# Patient Record
Sex: FEMALE | Race: WHITE | NOT HISPANIC OR LATINO
[De-identification: names, ages, dates, MRNs, and addresses within clinical notes are randomized per-mention and may not be internally consistent; named-entity substitution may affect disease eponyms.]

---

## 2017-08-17 ENCOUNTER — MEDICATION RENEWAL (OUTPATIENT)
Age: 52
End: 2017-08-17

## 2017-08-29 ENCOUNTER — APPOINTMENT (OUTPATIENT)
Dept: UROLOGY | Facility: CLINIC | Age: 52
End: 2017-08-29
Payer: COMMERCIAL

## 2017-08-29 VITALS
SYSTOLIC BLOOD PRESSURE: 114 MMHG | HEART RATE: 81 BPM | OXYGEN SATURATION: 98 % | TEMPERATURE: 98.1 F | WEIGHT: 114 LBS | BODY MASS INDEX: 22.98 KG/M2 | DIASTOLIC BLOOD PRESSURE: 75 MMHG | HEIGHT: 59 IN

## 2017-08-29 PROCEDURE — 99213 OFFICE O/P EST LOW 20 MIN: CPT

## 2017-09-01 ENCOUNTER — MEDICATION RENEWAL (OUTPATIENT)
Age: 52
End: 2017-09-01

## 2019-04-19 ENCOUNTER — TRANSCRIPTION ENCOUNTER (OUTPATIENT)
Age: 54
End: 2019-04-19

## 2021-07-21 ENCOUNTER — NON-APPOINTMENT (OUTPATIENT)
Age: 56
End: 2021-07-21

## 2021-07-30 ENCOUNTER — APPOINTMENT (OUTPATIENT)
Dept: GYNECOLOGIC ONCOLOGY | Facility: CLINIC | Age: 56
End: 2021-07-30
Payer: COMMERCIAL

## 2021-07-30 VITALS
BODY MASS INDEX: 25 KG/M2 | SYSTOLIC BLOOD PRESSURE: 140 MMHG | OXYGEN SATURATION: 99 % | WEIGHT: 124 LBS | HEIGHT: 59 IN | DIASTOLIC BLOOD PRESSURE: 87 MMHG | HEART RATE: 94 BPM

## 2021-07-30 DIAGNOSIS — Z78.9 OTHER SPECIFIED HEALTH STATUS: ICD-10-CM

## 2021-07-30 DIAGNOSIS — Z80.3 FAMILY HISTORY OF MALIGNANT NEOPLASM OF BREAST: ICD-10-CM

## 2021-07-30 DIAGNOSIS — Z86.39 PERSONAL HISTORY OF OTHER ENDOCRINE, NUTRITIONAL AND METABOLIC DISEASE: ICD-10-CM

## 2021-07-30 DIAGNOSIS — Z80.1 FAMILY HISTORY OF MALIGNANT NEOPLASM OF TRACHEA, BRONCHUS AND LUNG: ICD-10-CM

## 2021-07-30 DIAGNOSIS — Z83.3 FAMILY HISTORY OF DIABETES MELLITUS: ICD-10-CM

## 2021-07-30 PROCEDURE — 99204 OFFICE O/P NEW MOD 45 MIN: CPT | Mod: 25

## 2021-07-30 PROCEDURE — 76857 US EXAM PELVIC LIMITED: CPT | Mod: 59

## 2021-07-30 PROCEDURE — 76830 TRANSVAGINAL US NON-OB: CPT | Mod: 59

## 2021-07-30 PROCEDURE — 58100 BIOPSY OF UTERUS LINING: CPT | Mod: 52

## 2021-07-30 RX ORDER — DIAZEPAM 2 MG/1
2 TABLET ORAL 3 TIMES DAILY
Qty: 90 | Refills: 0 | Status: DISCONTINUED | COMMUNITY
Start: 2017-08-17 | End: 2021-07-30

## 2021-08-02 NOTE — END OF VISIT
[FreeTextEntry3] : Written by Petty CUETO, acting as a scribe for Dr. Nicko Lai.\par This note accurately reflects the work and decisions made by me.\par

## 2021-08-02 NOTE — ASSESSMENT
[FreeTextEntry1] : 56yo female with PMB, postcoital bleeding with insufficient endometrial biopsy. Attempted endometrial biopsy in the office but could not perform due to cervical stenosis. The procedure was aborted for fear of hurting the patient.  Pelvic sonogram in office today was reassuring with no signs of abnormalities. Recommendation is for MRI pelvis to further evaluate her pelvic structures and to determine if there is a need for D&C. \par \par ADDENDUM: \par After the patient was examined and I left the room, she spoke to my MA and PA (Petty Serrano, who was present throughout the entire exam and discussion) and complained the exam was too quick, that I was "rough" during her exam and she felt she did not comprehend everything I said. I sat down with the patient in the consult room along side my PA and listened to all her concerns. She was upset about the hygenical practice in my office and overall felt violated. I offered patient my apologies for her feeling this way and reassured her that I performed my usual pelvic exam and attempted to perform a biopsy but quickly aborted the procedure for fear of hurting her. I thoroughly explained my recommendations to her again. Pt understood and stated she would have the MRI but was not sure if she would return to see me. I also offered to give her contact information for other gyn oncologists in case she decided not to return here.      \par

## 2021-08-02 NOTE — PHYSICAL EXAM
[Normal] : Bimanual Exam: Normal [de-identified] : Patient was interviewed and examined with chaperone present. Name of chaperone: Petty Preston

## 2021-08-02 NOTE — CHIEF COMPLAINT
[FreeTextEntry1] : [facility]\par \par [facility] [phone] at [address] [place] [phone]\par

## 2021-08-19 ENCOUNTER — APPOINTMENT (OUTPATIENT)
Dept: GYNECOLOGIC ONCOLOGY | Facility: CLINIC | Age: 56
End: 2021-08-19
Payer: COMMERCIAL

## 2021-08-19 DIAGNOSIS — N95.0 POSTMENOPAUSAL BLEEDING: ICD-10-CM

## 2021-08-19 PROCEDURE — 99214 OFFICE O/P EST MOD 30 MIN: CPT

## 2021-08-30 PROBLEM — N95.0 PMB (POSTMENOPAUSAL BLEEDING): Status: ACTIVE | Noted: 2021-07-30

## 2021-08-30 NOTE — HISTORY OF PRESENT ILLNESS
[FreeTextEntry1] : Pt is a 54 yo with episode of PMB related to intercourse who was seen initially by Dr. Lai. She had MRI and presents for follow up. She has no new gynecologic concerns today.

## 2021-08-30 NOTE — ASSESSMENT
[FreeTextEntry1] : Pt is a 56 yo with PMB related to intercourse who had MRI. I reviewed the initial note by Dr. Lai and reviewed the MRI from 6/21/21. There are no concerning masses in the uterus, but given thickend endometrial lining and bleeding it is recommended to perform a D&C, hysteroscopy. The patient agrees with plan of care.\par \par I discussed at length with the patient the nature, purpose, risks, benefits, and alternatives to dilation with curettage and possible hysteroscopy.   She understands the risks to include (but not be limited to): uterine perforation with possible need for laparoscopy and/or laparotomy; infection with need for hospitalization; fluid overload with possible critical illness as a consequence; and bleeding with need for transfusion.  The patient agrees to proceed.\par \par

## 2021-08-30 NOTE — END OF VISIT
[FreeTextEntry3] : D&C, hysteroscopy \par Pre-surgical testing, CBC, BMP, PCP clearance [FreeTextEntry2] : Una De La Garza MA was present the entire duration of the patient interaction\par

## 2021-09-09 ENCOUNTER — RESULT REVIEW (OUTPATIENT)
Age: 56
End: 2021-09-09

## 2021-09-23 ENCOUNTER — APPOINTMENT (OUTPATIENT)
Dept: GYNECOLOGIC ONCOLOGY | Facility: CLINIC | Age: 56
End: 2021-09-23
Payer: COMMERCIAL

## 2021-09-23 DIAGNOSIS — N93.0 POSTCOITAL AND CONTACT BLEEDING: ICD-10-CM

## 2021-09-23 PROCEDURE — 99213 OFFICE O/P EST LOW 20 MIN: CPT

## 2021-09-23 NOTE — DISCUSSION/SUMMARY
[Erythema] : was erythematous [Doing Well] : is doing well [Excellent Pain Control] : has excellent pain control [No Sign of Infection] : is showing no signs of infection

## 2021-09-23 NOTE — REASON FOR VISIT
[Post Op] : post op visit [de-identified] : 9/9/21 [de-identified] : D&C, hysteroscopy  [de-identified] : Pt presents for post-op check. She reports some bleeding following the procedure. No nausea/vomiting, no fevers/chills.

## 2021-09-23 NOTE — ASSESSMENT
[FreeTextEntry1] : Pt is a 56 yo s/p D&C for post-coital bleeding. Pathology benign with no concern for malignancy or atypia. We discussed options for management of post-coital bleeding likely related to vaginal atrophy. Vaginal estrogen or lubrication could be considered. IF those do not resolve the bleeding evaluation of UA could be considered to rule out possibility of urinary tract bleeding.

## 2021-09-23 NOTE — END OF VISIT
[FreeTextEntry3] : Discharge to routine gynecologic care\par Patient encouraged to follow up if her bleeding persists despite use of lubrication/estrogen [FreeTextEntry2] : Joy Zheng MA was present the entire duration of the patient interaction and gynecological exam.\par

## 2023-10-19 NOTE — HISTORY OF PRESENT ILLNESS
Spoke with patient's son Carlos regarding pt's pain and immobility. Patient does not have [facility] home nursing, therefore labs cannot be drawn at home.  Discussed need for ambulance transfer for further evaluation. He states patient fears falling. Carlos will discuss with his mother and return call.     Carlos returns call and states that he and his brothe will get mom to lab appointment scheduled for tomorrow in [place] and follow-up with Dr. Oliveira, pt's pcp regarding hip pain.    [FreeTextEntry1] : This 56yo ,  x2, LMP 2019 referred for PMB. Patient reports intermittent bleeding with intercourse for the past few years but recently had a moderate amount of bright red blood which concerned her.  She has occasional dyspareunia with deep penetration mostly. 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 Pelvic US on 21 revealed RV uterus, 3mm lining, normal ovaries. Endometrial biopsy on 21-strips of benign endocervical epithelium with mucin and blood. SHG was attempted but limited by adhesions, unable to sample endometrium. Denies significant pelvic pains. \par \par Health maintenance:\par \par Pap smear-2020-reports wnl, h/o abn paps and HPV \par [id number] wnl \par Colonoscopy-approx 10 years ago-reports wnl \par DEXA- never

## 2024-04-11 ENCOUNTER — APPOINTMENT (OUTPATIENT)
Dept: UROLOGY | Facility: CLINIC | Age: 59
End: 2024-04-11

## 2024-04-11 ENCOUNTER — APPOINTMENT (OUTPATIENT)
Dept: UROLOGY | Facility: CLINIC | Age: 59
End: 2024-04-11
Payer: COMMERCIAL

## 2024-04-11 VITALS
HEART RATE: 100 BPM | HEIGHT: 59 IN | DIASTOLIC BLOOD PRESSURE: 82 MMHG | SYSTOLIC BLOOD PRESSURE: 131 MMHG | OXYGEN SATURATION: 97 %

## 2024-04-11 DIAGNOSIS — R10.2 PELVIC AND PERINEAL PAIN: ICD-10-CM

## 2024-04-11 DIAGNOSIS — M79.18 MYALGIA, OTHER SITE: ICD-10-CM

## 2024-04-11 DIAGNOSIS — N94.819 VULVODYNIA, UNSPECIFIED: ICD-10-CM

## 2024-04-11 DIAGNOSIS — N95.2 POSTMENOPAUSAL ATROPHIC VAGINITIS: ICD-10-CM

## 2024-04-11 DIAGNOSIS — R35.0 FREQUENCY OF MICTURITION: ICD-10-CM

## 2024-04-11 DIAGNOSIS — R32 UNSPECIFIED URINARY INCONTINENCE: ICD-10-CM

## 2024-04-11 DIAGNOSIS — E83.119 HEMOCHROMATOSIS, UNSPECIFIED: ICD-10-CM

## 2024-04-11 PROCEDURE — 51798 US URINE CAPACITY MEASURE: CPT

## 2024-04-11 PROCEDURE — 99204 OFFICE O/P NEW MOD 45 MIN: CPT

## 2024-04-11 RX ORDER — ESTRADIOL 0.1 MG/G
0.1 CREAM VAGINAL
Qty: 1 | Refills: 3 | Status: ACTIVE | COMMUNITY
Start: 2024-04-11 | End: 1900-01-01

## 2024-04-12 LAB
APPEARANCE: CLEAR
BACTERIA: NEGATIVE /HPF
BILIRUBIN URINE: NEGATIVE
BLOOD URINE: NEGATIVE
CAST: 0 /LPF
COLOR: YELLOW
EPITHELIAL CELLS: 0 /HPF
GLUCOSE QUALITATIVE U: NEGATIVE MG/DL
KETONES URINE: NEGATIVE MG/DL
LEUKOCYTE ESTERASE URINE: NEGATIVE
MICROSCOPIC-UA: NORMAL
NITRITE URINE: NEGATIVE
PH URINE: 6
PROTEIN URINE: NEGATIVE MG/DL
RED BLOOD CELLS URINE: 0 /HPF
SPECIFIC GRAVITY URINE: 1.01
UROBILINOGEN URINE: 0.2 MG/DL
WHITE BLOOD CELLS URINE: 1 /HPF

## 2024-04-15 LAB
BACTERIA UR CULT: NORMAL
URINE CYTOLOGY: NORMAL

## 2024-06-13 ENCOUNTER — NON-APPOINTMENT (OUTPATIENT)
Age: 59
End: 2024-06-13

## 2024-06-13 RX ORDER — DIAZEPAM 2 MG/1
2 TABLET ORAL
Qty: 90 | Refills: 0 | Status: ACTIVE | COMMUNITY
Start: 2024-04-11 | End: 1900-01-01

## 2024-07-12 ENCOUNTER — APPOINTMENT (OUTPATIENT)
Dept: UROLOGY | Facility: CLINIC | Age: 59
End: 2024-07-12

## 2024-07-25 ENCOUNTER — APPOINTMENT (OUTPATIENT)
Dept: NEUROLOGY | Facility: CLINIC | Age: 59
End: 2024-07-25

## 2024-07-25 VITALS
BODY MASS INDEX: 24.8 KG/M2 | HEART RATE: 97 BPM | OXYGEN SATURATION: 97 % | HEIGHT: 59 IN | DIASTOLIC BLOOD PRESSURE: 80 MMHG | WEIGHT: 123 LBS | SYSTOLIC BLOOD PRESSURE: 138 MMHG

## 2024-07-25 DIAGNOSIS — R53.81 OTHER MALAISE: ICD-10-CM

## 2024-07-25 DIAGNOSIS — R53.83 OTHER FATIGUE: ICD-10-CM

## 2024-07-25 DIAGNOSIS — R51.9 HEADACHE, UNSPECIFIED: ICD-10-CM

## 2024-07-25 DIAGNOSIS — B27.90 INFECTIOUS MONONUCLEOSIS, UNSPECIFIED W/OUT COMPLICATION: ICD-10-CM

## 2024-07-25 PROCEDURE — 99205 OFFICE O/P NEW HI 60 MIN: CPT

## 2024-07-26 NOTE — DATA REVIEWED
[de-identified] : CT Head 6/28/24: FINDINGS:  VENTRICLES AND SULCI: Normal in size and configuration. INTRA-AXIAL: No intraparenchymal mass, acute hemorrhage, or midline shift. The gray-white differentiation is maintained. EXTRA-AXIAL: No mass or fluid collection. Basal cisterns are normal in appearance.  VISUALIZED SINUSES:  Clear. TYMPANOMASTOID CAVITIES:  Clear. VISUALIZED ORBITS: Normal. CALVARIUM: Intact.   IMPRESSION: No acute intracranial hemorrhage, mass effect, or midline shift.

## 2024-07-26 NOTE — HISTORY OF PRESENT ILLNESS
[FreeTextEntry1] : precious Donato is a 57YO female, presenting with complaints of fatigue and dizziness with palpitations. She was diagnosed with hemochromatosis a year ago. After treatment for that, she started having these elevations in BP. She was on Crestor and felt excellent, then after a few months, had an IBS attack, that prompted her MDs to stop the Crestor. She repots having blurred vision due to dry eye temporarily a few months ago, but reports her vision has been less clear ever since.  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  On 6/28 she felt disoriented, confused and lethargic, but felt better when she went outside, so called the fire dept and had oven tested which was emitting dangerous levels of carbon monoxide. tested negative for CO poisoning in ED. Oven was since repaired.  Now reports waking up daily with a headache, and palpitations. Urinary incontinence at night, has follow-up with Urology next week. Has cardiac CT planned for a couple weeks.  Feels like she is in a brain fog for weeks now. Sometimes feels off balance, can feel dizzy like the room is spinning.  On nortriptyline for GI but has known issues with palpitations when taking it.  Concerned for sleep apnea, reports daily morning headaches, daytime fatigue, napping often, and possible apneic moments during the night.  Takes Motrin with a cup of coffee for headaches, tries to take only 1 pill if she can, but sometimes will take 2 if more severe.

## 2024-07-26 NOTE — DATA REVIEWED
[de-identified] : CT Head 6/28/24: FINDINGS:  VENTRICLES AND SULCI: Normal in size and configuration. INTRA-AXIAL: No intraparenchymal mass, acute hemorrhage, or midline shift. The gray-white differentiation is maintained. EXTRA-AXIAL: No mass or fluid collection. Basal cisterns are normal in appearance.  VISUALIZED SINUSES:  Clear. TYMPANOMASTOID CAVITIES:  Clear. VISUALIZED ORBITS: Normal. CALVARIUM: Intact.   IMPRESSION: No acute intracranial hemorrhage, mass effect, or midline shift.

## 2024-07-26 NOTE — HISTORY OF PRESENT ILLNESS
[FreeTextEntry1] : precious Donato is a 59YO female, presenting with complaints of fatigue and dizziness with palpitations. She was diagnosed with hemochromatosis a year ago. After treatment for that, she started having these elevations in BP. She was on Crestor and felt excellent, then after a few months, had an IBS attack, that prompted her MDs to stop the Crestor. She repots having blurred vision due to dry eye temporarily a few months ago, but reports her vision has been less clear ever since.  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  On 6/28 she felt disoriented, confused and lethargic, but felt better when she went outside, so called the fire dept and had oven tested which was emitting dangerous levels of carbon monoxide. tested negative for CO poisoning in ED. Oven was since repaired.  Now reports waking up daily with a headache, and palpitations. Urinary incontinence at night, has follow-up with Urology next week. Has cardiac CT planned for a couple weeks.  Feels like she is in a brain fog for weeks now. Sometimes feels off balance, can feel dizzy like the room is spinning.  On nortriptyline for GI but has known issues with palpitations when taking it.  Concerned for sleep apnea, reports daily morning headaches, daytime fatigue, napping often, and possible apneic moments during the night.  Takes Motrin with a cup of coffee for headaches, tries to take only 1 pill if she can, but sometimes will take 2 if more severe.

## 2024-07-26 NOTE — ASSESSMENT
[FreeTextEntry1] : Keeley Donato is a 59Yo female, presenting today for complaints of headaches and fatigue, with intermittent dizziness and palpitations. Patient has been on nortriptyline for IBS, with known palpitations, last dose was last night and patient reports palpitations with exertion today. We discussed that this medication is contraindicated in patients with known palpitations, as it can lead to prolonged intervals in the heart rhythm, therefore patient should discontinue use immediately.   We discussed that there are several factors that can be contributing to the patient's symptoms including, Amarilis Barr virus, History of hemochromatosis with recently elevated Ferritin, Possible underlying sleep apnea or other autoimmune diseases that have not yet been tested. Upon review of the patient's CAT scan it is unlikely that the patient is suffering from NPH as ventricles are within normal limits.  We discussed the option of doing an MRI of the brain however patient has a history of severe reaction to MRI contrast and would like to avoid that at this time if not necessary.  Discussed with the patient that we cannot rule out a diagnosis of MS without MRI of the brain or cervical spine.  She would like to do other testing first before considering this.  Plan: - Labs to assess for underlying causes for fatigue, headaches, and assess for autoimmune disorders.  - Sleep study referral for possible [facility]. - Will call patient with results and follow-up in 2 months.

## 2024-07-26 NOTE — PHYSICAL EXAM
[FreeTextEntry1] : GENERAL PHYSICAL EXAM: GEN: no distress, normal affect EYES: sclera white, conjunctiva clear, no nystagmus PULM: no respiratory distress EXT: no edema, no cyanosis MSK: muscle tone and strength normal SKIN: warm, dry, no rash or lesion on exposed skin   NEUROLOGICAL EXAM: Mental Status Orientation: alert and oriented to person, place, time, and situation Language: clear and fluent, intact comprehension and repetition   Cranial Nerves II: visual fields full to confrontation III, IV, VI: PERRL, EOMI V, VII: facial sensation and movement intact and symmetric VIII: hearing intact IX, X: uvula midline, soft palate elevates normally XI: BL shoulder shrug intact XII: tongue midline   Motor Bilateral muscle strength 5/5 in UE and LE, proximally and distally Tone and bulk are normal in upper and lower limbs   Sensation Intact to light touch in all 4 EXTs   Reflex 2+ in BL biceps, brachioradialis, patella   Coordination Normal FTN bilaterally   Gait Normal stance, stride, and pivot turn Tandem walk intact Negative Romberg.

## 2024-08-08 ENCOUNTER — APPOINTMENT (OUTPATIENT)
Dept: UROLOGY | Facility: CLINIC | Age: 59
End: 2024-08-08

## 2024-10-02 ENCOUNTER — APPOINTMENT (OUTPATIENT)
Dept: NEUROLOGY | Facility: CLINIC | Age: 59
End: 2024-10-02
Payer: COMMERCIAL

## 2024-10-02 VITALS
BODY MASS INDEX: 24.8 KG/M2 | SYSTOLIC BLOOD PRESSURE: 141 MMHG | HEIGHT: 59 IN | WEIGHT: 123 LBS | OXYGEN SATURATION: 98 % | DIASTOLIC BLOOD PRESSURE: 101 MMHG | HEART RATE: 93 BPM

## 2024-10-02 DIAGNOSIS — R51.9 HEADACHE, UNSPECIFIED: ICD-10-CM

## 2024-10-02 DIAGNOSIS — R53.83 OTHER FATIGUE: ICD-10-CM

## 2024-10-02 PROCEDURE — 99215 OFFICE O/P EST HI 40 MIN: CPT

## 2024-10-02 RX ORDER — PANTOPRAZOLE 40 MG/1
TABLET, DELAYED RELEASE ORAL
Refills: 0 | Status: ACTIVE | COMMUNITY

## 2024-10-02 RX ORDER — NORTRIPTYLINE HYDROCHLORIDE 10 MG/1
10 CAPSULE ORAL
Refills: 0 | Status: ACTIVE | COMMUNITY

## 2024-10-02 NOTE — PHYSICAL EXAM
[General Appearance - Alert] : alert [General Appearance - In No Acute Distress] : in no acute distress [Oriented To Time, Place, And Person] : oriented to person, place, and time [Impaired Insight] : insight and judgment were intact [Affect] : the affect was normal [Person] : oriented to person [Place] : oriented to place [Time] : oriented to time [Concentration Intact] : normal concentrating ability [Visual Intact] : visual attention was ~T not ~L decreased [Naming Objects] : no difficulty naming common objects [Repeating Phrases] : no difficulty repeating a phrase [Writing A Sentence] : no difficulty writing a sentence [Fluency] : fluency intact [Comprehension] : comprehension intact [Reading] : reading intact [Past History] : adequate knowledge of personal past history [Cranial Nerves Optic (II)] : visual acuity intact bilaterally,  visual fields full to confrontation, pupils equal round and reactive to light [Cranial Nerves Oculomotor (III)] : extraocular motion intact [Cranial Nerves Trigeminal (V)] : facial sensation intact symmetrically [Cranial Nerves Facial (VII)] : face symmetrical [Cranial Nerves Vestibulocochlear (VIII)] : hearing was intact bilaterally [Cranial Nerves Glossopharyngeal (IX)] : tongue and palate midline [Cranial Nerves Accessory (XI - Cranial And Spinal)] : head turning and shoulder shrug symmetric [Cranial Nerves Hypoglossal (XII)] : there was no tongue deviation with protrusion [Motor Tone] : muscle tone was normal in all four extremities [Motor Strength] : muscle strength was normal in all four extremities [No Muscle Atrophy] : normal bulk in all four extremities [Sensation Tactile Decrease] : light touch was intact [Balance] : balance was intact [2+] : Ankle jerk left 2+ [Sclera] : the sclera and conjunctiva were normal [PERRL With Normal Accommodation] : pupils were equal in size, round, reactive to light, with normal accommodation [Outer Ear] : the ears and nose were normal in appearance [Neck Appearance] : the appearance of the neck was normal [Abnormal Walk] : normal gait [Skin Color & Pigmentation] : normal skin color and pigmentation [] : no rash [Past-pointing] : there was no past-pointing [Tremor] : no tremor present [Plantar Reflex Right Only] : normal on the right [Plantar Reflex Left Only] : normal on the left

## 2024-10-02 NOTE — HISTORY OF PRESENT ILLNESS
[FreeTextEntry1] : 10/2/24: Labs were negative for underlying causes for fatigue and dizziness.  Sleep study ordered, had consultation with sleep specialist on 8/26/24. Sleep study not completed as of this time. She is having GI issues, lost 15 lbs in the last year. Having trouble eating and has hemochromatosis therefore has a lot of allergic reactions to medications and intolerances. Diet is poor due to stomach pain, working with GI to find the underlying cause.  She reports dizziness and disorientation has improved significantly.  Patient does report new onset of headaches.  She reports these headaches are typically tension headaches in the occipital region but do, symptoms such as photophobia and lightheadedness.  She does report occasional nausea however unsure if that is related to the headaches or GI issues.  Patient reports normally being able to relieve these headaches with caffeine and Tylenol if needed.  She reports today she is having a more severe headache, 8 out of 10 pain.  Has not trialed Ubrelvy or Nurtec samples that were given to her at the last appointment as of this time.   7/25/24: Keeley Donato is a 59YO female, presenting with complaints of fatigue and dizziness with palpitations. She was diagnosed with hemochromatosis a year ago. After treatment for that, she started having these elevations in BP. She was on Crestor and felt excellent, then after a few months, had an IBS attack, that prompted her MDs to stop the Crestor. She repots having blurred vision due to dry eye temporarily a few months ago, but reports her vision has been less clear ever since.  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  On 6/28 she felt disoriented, confused and lethargic, but felt better when she went outside, so called the fire dept and had oven tested which was emitting dangerous levels of carbon monoxide. tested negative for CO poisoning in ED. Oven was since repaired.  Now reports waking up daily with a headache, and palpitations. Urinary incontinence at night, has follow-up with Urology next week. Has cardiac CT planned for a couple weeks.  Feels like she is in a brain fog for weeks now. Sometimes feels off balance, can feel dizzy like the room is spinning.  On nortriptyline for GI but has known issues with palpitations when taking it.  Concerned for sleep apnea, reports daily morning headaches, daytime fatigue, napping often, and possible apneic moments during the night.  Takes Motrin with a cup of coffee for headaches, tries to take only 1 pill if she can, but sometimes will take 2 if more severe.

## 2024-10-02 NOTE — ASSESSMENT
[FreeTextEntry1] : 58-year-old female presenting to the office today for follow-up for fatigue and dizziness.  Patient reports dizziness and disorientation has improved significantly.  She reports she is now having occasional occipital headaches, bilateral, 8 out of 10 pain at their worst.  Patient reports she will typically try to treat them with Tylenol and caffeine however will be trying samples of Nurtec and Ubrelvy that were given to her at the last appointment later today to assess if this more severe 1 that she is having right now can be treated with either 1.  We discussed that Nurtec is likely the better option for her as it dissolves under the tongue and patient has known GI issues.  Patient was recently restarted on nortriptyline for IBS, discussed that this may help improve her headaches when she is on it for a few weeks.  Neurological exam remains unremarkable, headaches are typical of occipital migraines and or tension headaches.    Discussed with the patient that at this time she may follow-up as needed however should she require prescription for Nurtec or Ubrelvy that she would be required to have at least 1 office visit per year to maintain the prescription.  No further testing is required at this time.

## 2025-01-13 ENCOUNTER — APPOINTMENT (OUTPATIENT)
Dept: UROLOGY | Facility: CLINIC | Age: 60
End: 2025-01-13